# Patient Record
Sex: MALE | Race: WHITE | NOT HISPANIC OR LATINO | Employment: FULL TIME | URBAN - METROPOLITAN AREA
[De-identification: names, ages, dates, MRNs, and addresses within clinical notes are randomized per-mention and may not be internally consistent; named-entity substitution may affect disease eponyms.]

---

## 2019-10-10 ENCOUNTER — HOSPITAL ENCOUNTER (EMERGENCY)
Facility: HOSPITAL | Age: 34
Discharge: HOME | End: 2019-10-10
Attending: EMERGENCY MEDICINE
Payer: COMMERCIAL

## 2019-10-10 VITALS
RESPIRATION RATE: 17 BRPM | HEIGHT: 71 IN | SYSTOLIC BLOOD PRESSURE: 128 MMHG | OXYGEN SATURATION: 99 % | TEMPERATURE: 98 F | WEIGHT: 190 LBS | DIASTOLIC BLOOD PRESSURE: 70 MMHG | BODY MASS INDEX: 26.6 KG/M2 | HEART RATE: 61 BPM

## 2019-10-10 DIAGNOSIS — R55 SYNCOPE: ICD-10-CM

## 2019-10-10 LAB
ALBUMIN SERPL BCP-MCNC: 4.6 G/DL (ref 3.5–5.2)
ALP SERPL-CCNC: 80 U/L (ref 55–135)
ALT SERPL W/O P-5'-P-CCNC: 17 U/L (ref 10–44)
ANION GAP SERPL CALC-SCNC: 12 MMOL/L (ref 8–16)
AST SERPL-CCNC: 20 U/L (ref 10–40)
BASOPHILS # BLD AUTO: 0.03 K/UL (ref 0–0.2)
BASOPHILS NFR BLD: 0.3 % (ref 0–1.9)
BILIRUB SERPL-MCNC: 0.4 MG/DL (ref 0.1–1)
BUN SERPL-MCNC: 12 MG/DL (ref 6–20)
CALCIUM SERPL-MCNC: 9.5 MG/DL (ref 8.7–10.5)
CHLORIDE SERPL-SCNC: 100 MMOL/L (ref 95–110)
CO2 SERPL-SCNC: 27 MMOL/L (ref 23–29)
CREAT SERPL-MCNC: 0.9 MG/DL (ref 0.5–1.4)
DIFFERENTIAL METHOD: ABNORMAL
EOSINOPHIL # BLD AUTO: 0 K/UL (ref 0–0.5)
EOSINOPHIL NFR BLD: 0.4 % (ref 0–8)
ERYTHROCYTE [DISTWIDTH] IN BLOOD BY AUTOMATED COUNT: 12.4 % (ref 11.5–14.5)
EST. GFR  (AFRICAN AMERICAN): >60 ML/MIN/1.73 M^2
EST. GFR  (NON AFRICAN AMERICAN): >60 ML/MIN/1.73 M^2
GLUCOSE SERPL-MCNC: 107 MG/DL (ref 70–110)
HCT VFR BLD AUTO: 44 % (ref 40–54)
HGB BLD-MCNC: 14.2 G/DL (ref 14–18)
IMM GRANULOCYTES # BLD AUTO: 0.04 K/UL (ref 0–0.04)
IMM GRANULOCYTES NFR BLD AUTO: 0.4 % (ref 0–0.5)
LYMPHOCYTES # BLD AUTO: 0.8 K/UL (ref 1–4.8)
LYMPHOCYTES NFR BLD: 8.4 % (ref 18–48)
MCH RBC QN AUTO: 29.6 PG (ref 27–31)
MCHC RBC AUTO-ENTMCNC: 32.3 G/DL (ref 32–36)
MCV RBC AUTO: 92 FL (ref 82–98)
MONOCYTES # BLD AUTO: 0.4 K/UL (ref 0.3–1)
MONOCYTES NFR BLD: 4.3 % (ref 4–15)
NEUTROPHILS # BLD AUTO: 8.6 K/UL (ref 1.8–7.7)
NEUTROPHILS NFR BLD: 86.2 % (ref 38–73)
NRBC BLD-RTO: 0 /100 WBC
PLATELET # BLD AUTO: 282 K/UL (ref 150–350)
PMV BLD AUTO: 9.3 FL (ref 9.2–12.9)
POTASSIUM SERPL-SCNC: 3.8 MMOL/L (ref 3.5–5.1)
PROT SERPL-MCNC: 8.1 G/DL (ref 6–8.4)
RBC # BLD AUTO: 4.79 M/UL (ref 4.6–6.2)
SODIUM SERPL-SCNC: 139 MMOL/L (ref 136–145)
WBC # BLD AUTO: 9.99 K/UL (ref 3.9–12.7)

## 2019-10-10 PROCEDURE — 96360 HYDRATION IV INFUSION INIT: CPT

## 2019-10-10 PROCEDURE — 99284 EMERGENCY DEPT VISIT MOD MDM: CPT | Mod: 25

## 2019-10-10 PROCEDURE — 93010 ELECTROCARDIOGRAM REPORT: CPT | Mod: ,,, | Performed by: INTERNAL MEDICINE

## 2019-10-10 PROCEDURE — 96361 HYDRATE IV INFUSION ADD-ON: CPT

## 2019-10-10 PROCEDURE — 80053 COMPREHEN METABOLIC PANEL: CPT

## 2019-10-10 PROCEDURE — 93010 EKG 12-LEAD: ICD-10-PCS | Mod: ,,, | Performed by: INTERNAL MEDICINE

## 2019-10-10 PROCEDURE — 63600175 PHARM REV CODE 636 W HCPCS: Performed by: EMERGENCY MEDICINE

## 2019-10-10 PROCEDURE — 93005 ELECTROCARDIOGRAM TRACING: CPT

## 2019-10-10 PROCEDURE — 85025 COMPLETE CBC W/AUTO DIFF WBC: CPT

## 2019-10-10 RX ORDER — SERTRALINE HYDROCHLORIDE 100 MG/1
100 TABLET, FILM COATED ORAL DAILY
COMMUNITY

## 2019-10-10 RX ADMIN — SODIUM CHLORIDE 1000 ML: 0.9 INJECTION, SOLUTION INTRAVENOUS at 04:10

## 2019-10-10 NOTE — ED TRIAGE NOTES
"Pt arrived to the ED via POV from home with c/o syncope. Pt reports being at the [facility] sitting down when he reportedly "blacked out". Pt also c/o vomiting ,diaphoresis and SOB. Reports symptoms all started on today roughly one hour PTA. On admit to ED bed, pt AAOx4, NAD noted, VSS.  "

## 2019-10-10 NOTE — ED PROVIDER NOTES
"Encounter Date: 10/10/2019    SCRIBE #1 NOTE: I, Gino Chatman, am scribing for, and in the presence of,  Timbo Lundberg MD. I have scribed the following portions of the note - Other sections scribed: HPI, ROS.       History     Chief Complaint   Patient presents with    Loss of Consciousness     " I started having trouble breathing and then I black out. I started sweating and I threw up".      CC: Loss of Consciousness     HPI: This 33 y.o M with no pertinent PMHx presents to the ED c/o LOC PTA. The pt was sitting down at a barbershop when he became SOB, followed by nausea, chills and LOC. He regained consciousness after an unkown time. He then became dizzy and diaphoretic. He attempted to rest for approximately 45 minutes without any improvement of his symptoms. Currently, he c/o intermittent SOB. His nausea has since resolved. He is not Rx'ed hormone therapy. He does smoke cigarettes occasionally. The pt denies hemoptysis, hormone therapy, chest pain, chest tightness, headaches, eye pain, otalgia, sore throat, cough, abdominal pain, diarrhea, dysuria, arm or leg trouble and rash. He does not consume EtOH or use illicit drugs. No prior tx.        Review of patient's allergies indicates:   Allergen Reactions    Iodine and iodide containing products      Past Medical History:   Diagnosis Date    Anxiety     Depression     PTSD (post-traumatic stress disorder)      History reviewed. No pertinent surgical history.  History reviewed. No pertinent family history.  Social History     Tobacco Use    Smoking status: Current Every Day Smoker     Packs/day: 0.50     Years: 5.00     Pack years: 2.50     Types: Cigarettes    Smokeless tobacco: Never Used   Substance Use Topics    Alcohol use: Yes     Comment: socially    Drug use: Never     Review of Systems   Constitutional: Positive for chills and diaphoresis. Negative for fever.   HENT: Negative for congestion, ear pain, rhinorrhea and sore throat.    Eyes: " Negative for pain and visual disturbance.   Respiratory: Positive for shortness of breath. Negative for cough.    Cardiovascular: Negative for chest pain.   Gastrointestinal: Positive for nausea. Negative for abdominal pain, diarrhea and vomiting.   Genitourinary: Negative for dysuria.   Musculoskeletal: Negative for back pain and neck pain.   Skin: Negative for rash.   Neurological: Positive for dizziness and syncope. Negative for headaches.       Physical Exam     Initial Vitals [10/10/19 1541]   BP Pulse Resp Temp SpO2   125/66 65 18 97.4 °F (36.3 °C) 100 %      MAP       --         Physical Exam  The patient was examined specifically for the following:   General:No significant distress, Good color, Warm and dry. Head and neck:Scalp atraumatic, Neck supple. Neurological:Appropriate conversation, Gross motor deficits. Eyes:Conjugate gaze, Clear corneas. ENT: No epistaxis. Cardiac: Regular rate and rhythm, Grossly normal heart tones. Pulmonary: Wheezing, Rales. Gastrointestinal: Abdominal tenderness, Abdominal distention. Musculoskeletal: Extremity deformity, Apparent pain with range of motion of the joints. Skin: Rash.   The findings on examination were normal.  Lungs are clear.  The heart tones are normal. The abdomen is soft.  ED Course   Procedures  Labs Reviewed   CBC W/ AUTO DIFFERENTIAL - Abnormal; Notable for the following components:       Result Value    Gran # (ANC) 8.6 (*)     Lymph # 0.8 (*)     Gran% 86.2 (*)     Lymph% 8.4 (*)     All other components within normal limits   COMPREHENSIVE METABOLIC PANEL   URINALYSIS, REFLEX TO URINE CULTURE     EKG Readings: (Independently Interpreted)   This patient is in a normal sinus rhythm with a heart rate of 63.  The p.r. QRS and QT intervals are normal.  There is no definite evidence of acute myocardial infarction or malignant arrhythmia.       Imaging Results    None       Medical decision making:  Given the above, this patient presents to the emergency room  with an episode of syncope.  He was short of breath at 1st.  The patient has a history of anxiety and PTSD.  This patient is low risk for cardiac arrhythmia.  The patient is not short of breath now.  I specifically doubt pulmonary embolus.  This patient has a heart rate of 65 and oxygen saturation 100 %.  The patient is PERC negative. The EKG is unremarkable. I will discharge to follow up with primary care.                Scribe Attestation:   Scribe #1: I performed the above scribed service and the documentation accurately describes the services I performed. I attest to the accuracy of the note.     I personally performed the services described in this documentation.  All medical record  entries made by the scribe are at my direction and in my presence.  Signed, Dr. Lundberg           Clinical Impression:       ICD-10-CM ICD-9-CM   1. Syncope R55 780.2                                Timbo Lundberg MD  10/10/19 1752

## 2019-10-10 NOTE — DISCHARGE INSTRUCTIONS
Lots of liquids.  Regular meals.  Please return immediately if you get worse or if new problems develop.  Please follow-up with your primary care doctor this week.  Rest.  You may see the primary care doctor above.